# Patient Record
Sex: MALE | NOT HISPANIC OR LATINO
[De-identification: names, ages, dates, MRNs, and addresses within clinical notes are randomized per-mention and may not be internally consistent; named-entity substitution may affect disease eponyms.]

---

## 2017-11-06 PROCEDURE — 84154 ASSAY OF PSA FREE: CPT | Performed by: FAMILY MEDICINE

## 2017-11-06 PROCEDURE — 84153 ASSAY OF PSA TOTAL: CPT | Performed by: FAMILY MEDICINE

## 2018-05-25 ENCOUNTER — IMAGING SERVICES (OUTPATIENT)
Dept: OTHER | Age: 58
End: 2018-05-25

## 2018-05-25 ENCOUNTER — CHARTING TRANS (OUTPATIENT)
Dept: OTHER | Age: 58
End: 2018-05-25

## 2018-05-25 ASSESSMENT — PAIN SCALES - GENERAL: PAINLEVEL_OUTOF10: 0

## 2019-03-05 VITALS
HEART RATE: 96 BPM | TEMPERATURE: 97.7 F | SYSTOLIC BLOOD PRESSURE: 120 MMHG | RESPIRATION RATE: 18 BRPM | OXYGEN SATURATION: 94 % | DIASTOLIC BLOOD PRESSURE: 74 MMHG

## 2019-11-25 ENCOUNTER — HOSPITAL ENCOUNTER (INPATIENT)
Facility: HOSPITAL | Age: 59
LOS: 2 days | Discharge: HOME | DRG: 065 | End: 2019-11-27
Attending: EMERGENCY MEDICINE | Admitting: INTERNAL MEDICINE
Payer: COMMERCIAL

## 2019-11-25 ENCOUNTER — APPOINTMENT (OUTPATIENT)
Dept: MRI IMAGING | Facility: HOSPITAL | Age: 59
DRG: 065 | End: 2019-11-25
Attending: EMERGENCY MEDICINE
Payer: COMMERCIAL

## 2019-11-25 ENCOUNTER — APPOINTMENT (OUTPATIENT)
Dept: GENERAL RADIOLOGY | Facility: HOSPITAL | Age: 59
DRG: 065 | End: 2019-11-25
Attending: HOSPITALIST
Payer: COMMERCIAL

## 2019-11-25 DIAGNOSIS — R89.9 ABNORMAL LABORATORY TEST: ICD-10-CM

## 2019-11-25 DIAGNOSIS — I63.9 ACUTE ISCHEMIC STROKE (HCC): Primary | ICD-10-CM

## 2019-11-25 DIAGNOSIS — Z80.42 FAMILY HISTORY OF PROSTATE CANCER: ICD-10-CM

## 2019-11-25 DIAGNOSIS — G93.9 PONTINE LESION: ICD-10-CM

## 2019-11-25 DIAGNOSIS — E78.00 HIGH CHOLESTEROL: ICD-10-CM

## 2019-11-25 PROCEDURE — 71045 X-RAY EXAM CHEST 1 VIEW: CPT | Performed by: HOSPITALIST

## 2019-11-25 PROCEDURE — 70549 MR ANGIOGRAPH NECK W/O&W/DYE: CPT | Performed by: EMERGENCY MEDICINE

## 2019-11-25 PROCEDURE — 70553 MRI BRAIN STEM W/O & W/DYE: CPT | Performed by: EMERGENCY MEDICINE

## 2019-11-25 PROCEDURE — 70546 MR ANGIOGRAPH HEAD W/O&W/DYE: CPT | Performed by: EMERGENCY MEDICINE

## 2019-11-25 RX ORDER — ASPIRIN 81 MG/1
81 TABLET, CHEWABLE ORAL DAILY
Status: DISCONTINUED | OUTPATIENT
Start: 2019-11-26 | End: 2019-11-27

## 2019-11-25 RX ORDER — HEPARIN SODIUM 5000 [USP'U]/ML
5000 INJECTION, SOLUTION INTRAVENOUS; SUBCUTANEOUS EVERY 8 HOURS SCHEDULED
Status: DISCONTINUED | OUTPATIENT
Start: 2019-11-25 | End: 2019-11-26

## 2019-11-25 RX ORDER — POTASSIUM CHLORIDE 20 MEQ/1
40 TABLET, EXTENDED RELEASE ORAL ONCE
Status: COMPLETED | OUTPATIENT
Start: 2019-11-25 | End: 2019-11-25

## 2019-11-25 RX ORDER — ATORVASTATIN CALCIUM 20 MG/1
20 TABLET, FILM COATED ORAL NIGHTLY
Status: DISCONTINUED | OUTPATIENT
Start: 2019-11-25 | End: 2019-11-26

## 2019-11-25 RX ORDER — ACETAMINOPHEN 325 MG/1
650 TABLET ORAL EVERY 6 HOURS PRN
Status: DISCONTINUED | OUTPATIENT
Start: 2019-11-25 | End: 2019-11-27

## 2019-11-25 RX ORDER — ASPIRIN 81 MG/1
324 TABLET, CHEWABLE ORAL ONCE
Status: COMPLETED | OUTPATIENT
Start: 2019-11-25 | End: 2019-11-25

## 2019-11-25 RX ORDER — SODIUM CHLORIDE 9 MG/ML
INJECTION, SOLUTION INTRAVENOUS CONTINUOUS
Status: DISCONTINUED | OUTPATIENT
Start: 2019-11-25 | End: 2019-11-27

## 2019-11-25 NOTE — ED INITIAL ASSESSMENT (HPI)
Pt states Saturday he got on a ladder because he was painting and had to get off the ladder because he wasn't feeling right, \"something was wrong. \" pt sat on the couch and had an \"explosion without pain in his head, like a thousand cicadas in my head. \"

## 2019-11-25 NOTE — ED PROVIDER NOTES
Patient Seen in: [facility] Emergency Department      History   Patient presents with:  Stroke (neurologic)    Stated Complaint: reports \"stroke\" s/s on Saturday that resolved, was told to come in for further*    HPI    Patient is a 72-year-old ma Tobacco comment: in his 19's    Alcohol use: No      Comment: none for 5 years    Drug use:  No             Review of Systems    Positive for stated complaint: reports \"stroke\" s/s on Saturday that resolved, was told to come in for further*  Other systems plantarflexion, and at the [facility]. This is true bilaterally. Finger to nose intact. Heel to shin intact. Neg Kernig's and Brudzinski's sign. Skin: Skin is warm and dry. Psychiatric: Normal mood and affect.  Thought content normal.         E palpitations. Will obtain MRI/MRA brain and reevaluate afterwards. Admission disposition: 11/25/2019  8:07 PM            1943  Case was discussed with , neurology on-call. She did review the imaging.   Advises admission for potential treatme

## 2019-11-26 ENCOUNTER — APPOINTMENT (OUTPATIENT)
Dept: LAB | Facility: HOSPITAL | Age: 59
DRG: 065 | End: 2019-11-26
Attending: NURSE PRACTITIONER
Payer: COMMERCIAL

## 2019-11-26 ENCOUNTER — APPOINTMENT (OUTPATIENT)
Dept: GENERAL RADIOLOGY | Facility: HOSPITAL | Age: 59
DRG: 065 | End: 2019-11-26
Attending: NURSE PRACTITIONER
Payer: COMMERCIAL

## 2019-11-26 PROBLEM — G93.9 PONTINE LESION: Status: ACTIVE | Noted: 2019-11-26

## 2019-11-26 PROCEDURE — 009U3ZX DRAINAGE OF SPINAL CANAL, PERCUTANEOUS APPROACH, DIAGNOSTIC: ICD-10-PCS | Performed by: RADIOLOGY

## 2019-11-26 PROCEDURE — 62270 DX LMBR SPI PNXR: CPT | Performed by: NURSE PRACTITIONER

## 2019-11-26 PROCEDURE — 77003 FLUOROGUIDE FOR SPINE INJECT: CPT | Performed by: NURSE PRACTITIONER

## 2019-11-26 PROCEDURE — 99223 1ST HOSP IP/OBS HIGH 75: CPT | Performed by: OTHER

## 2019-11-26 RX ORDER — ATORVASTATIN CALCIUM 40 MG/1
40 TABLET, FILM COATED ORAL NIGHTLY
Status: DISCONTINUED | OUTPATIENT
Start: 2019-11-26 | End: 2019-11-27

## 2019-11-26 RX ORDER — POTASSIUM CHLORIDE 14.9 MG/ML
20 INJECTION INTRAVENOUS ONCE
Status: COMPLETED | OUTPATIENT
Start: 2019-11-26 | End: 2019-11-26

## 2019-11-26 NOTE — ED NOTES
Ambulatory to the bathroom with steady gait. Ok per Dr. Slade Chung to give patient a turkey sandwich. [place] and gatorade given to patient, awaiting admission.

## 2019-11-26 NOTE — PLAN OF CARE
Assumed care of patient at 0700  A/OX4, Neuro checks Q4, RA, SB on tele  Patient NPO for scheduled LP; LP conducted @ 3525 w/o complications  Patient's diet updated to regular/general  Patient denies pain  Plan is to wait on Neuro's assessment of LP result

## 2019-11-26 NOTE — PLAN OF CARE
Received pt from ER 2140. Aox4. Neuro checks q2hrs until 0600. Exam intact. Dr River Contreras notified of consult. Order to keep pt NPO after midnight. SB per tele, HR upper 40s to 50s while sleeping. SBP within ordered parameters. Denies pain.  POC updated wit

## 2019-11-26 NOTE — PAYOR COMM NOTE
--------------  ADMISSION REVIEW     Payor: Dawit Galarza #:  [id number]  Authorization Number: N/A    ED Provider Notes        Patient Seen in: [facility] Emergency Department      History   Patient presents with:  Stroke (neurologic) complaint: reports \"stroke\" s/s on Saturday that resolved, was told to come in for further*  Other systems are as noted in HPI. Constitutional and vital signs reviewed. All other systems reviewed and negative except as noted above.     Physical Exam Skin: Skin is warm and dry. Psychiatric: Normal mood and affect.  Thought content normal.         ED Course     Labs Reviewed   COMP METABOLIC PANEL (14) - Abnormal; Notable for the following components:       Result Value    Glucose 127 (*)     P symptoms to food poisoning as he ate some old pasta. He was found by his niece who was able to fill in some details as he doesn't recall the full event.  He now feels back to normal. He does have some mild ringing in his ears which is chronic, no hx of vert presented w/ multiple neurologic complaints.      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

## 2019-11-26 NOTE — H&P
[address] [facility] Patient Status:  Inpatient    1960 MRN [id number]   [facility] 7NE-A Attending Don Altamirano, [address] [facility] Day # 1 PCP Ave Cruz MD     Date:  2019  Date of Admission: years: .76      Smokeless tobacco: Never Used      Tobacco comment: in his 19's    Alcohol use: No      Comment: none for 5 years    Drug use: No      Allergies/Medications:    Allergies:   Sulfa Antibiotics           Comment:As a child  atorvastatin 20 MG Sujatha Vaughan MD on 11/25/2019 at 22:33          Mri Brain Mra Head+mra Neck (all W+wo) (cpt=[id number])    Result Date: 11/25/2019  CONCLUSION:  1. No acute infarct, intracranial hemorrhage, or hydrocephalus is identified.  2. There is a punctate focus [phone]

## 2019-11-26 NOTE — CONSULTS
[facility]    Report of Consultation    Kayy Levin Patient Status:  Inpatient    1960 MRN [id number]   [facility] 7NE-A Attending Slime Rivera, [address] [facility] Day # 1 PCP Shirl Collet, MD     Date of Admission:  2019 Neurological Disorder Paternal Grandmother         PARKINSONS   • Cancer Paternal Grandfather         PROSTATE      reports that he has quit smoking. He has a 0.75 pack-year smoking history.  He has never used smokeless tobacco. He reports that he does not 11/25/2019    BILT 1.2 11/25/2019    ALB 4.1 11/25/2019    TP 7.2 11/25/2019       Imaging:  MRI brain, MRA H/N all w/wo: CONCLUSION:    1. No acute infarct, intracranial hemorrhage, or hydrocephalus is identified.   2. There is a punctate focus of T2 hyper

## 2019-11-27 ENCOUNTER — APPOINTMENT (OUTPATIENT)
Dept: CV DIAGNOSTICS | Facility: HOSPITAL | Age: 59
DRG: 065 | End: 2019-11-27
Attending: Other
Payer: COMMERCIAL

## 2019-11-27 VITALS
DIASTOLIC BLOOD PRESSURE: 87 MMHG | TEMPERATURE: 98 F | RESPIRATION RATE: 15 BRPM | HEIGHT: 73 IN | WEIGHT: 200 LBS | OXYGEN SATURATION: 99 % | BODY MASS INDEX: 26.51 KG/M2 | SYSTOLIC BLOOD PRESSURE: 145 MMHG | HEART RATE: 71 BPM

## 2019-11-27 PROCEDURE — 93306 TTE W/DOPPLER COMPLETE: CPT | Performed by: OTHER

## 2019-11-27 PROCEDURE — 99233 SBSQ HOSP IP/OBS HIGH 50: CPT | Performed by: OTHER

## 2019-11-27 RX ORDER — ATORVASTATIN CALCIUM 40 MG/1
TABLET, FILM COATED ORAL
Qty: 90 TABLET | Refills: 3 | Status: SHIPPED | OUTPATIENT
Start: 2019-11-27 | End: 2020-10-01

## 2019-11-27 NOTE — DISCHARGE SUMMARY
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 No rebound tenderness  Neurologic: No focal neurological deficits. Musculoskeletal: Full range of motion of all extremities. No swelling noted. Integument: No lesions. No erythema. Psychiatric: Appropriate mood and affect.     Discharge Plan:

## 2019-11-27 NOTE — PLAN OF CARE
Patient is alert and oriented. Denies pain and nausea. Bandaid to lower back clean,dry, intact. Denies pain or nausea. Ambulates independently in room. NSR/SB on telemetry. Plan to return home at discharge.        Problem: Patient/Family Goals  Goal: P

## 2019-11-27 NOTE — PLAN OF CARE
Assumed care of patient at 0700  A/OX4, Q4 neuro checks performed, RA, NSR/SB  Echo performed on patient w/o complication  Neurology saw patient and was okay to d/c home.    Discharge planning discussed with patient and all questions were answered with the

## 2019-11-27 NOTE — PROGRESS NOTES
[id number] [facility] Progress Note    Sandra Hernandez Patient Status:  Inpatient    1960 MRN [id number]   [facility] 7NE-A Attending Isabela Montana, [address] [facility] Day # 2 PCP Emmanuel Hein MD     [facility]      Neurology A Speech: Fluent, no dysarthria  Memory and comprehension: Intact    Cranial Nerves: VFF, PERRL 3mm brisk, EOMI, no nystagmus, facial sensation intact, face symmetric, tongue midline, shoulder shrug equal, normal hearing, remainder CN GI   Motor: No drift, demyelination  Syncope  S/p LP on 11/26    Diagnostics/Imaging    MRA H/N: short segment stenosis in V4 right vert, otherwise unremarkable   Echo pending     TSH 1.440  A1C  5.9  B12 585  JACKIE pending  CSF protein 50.3, glucose 53, WBC 1, rest of lab

## 2019-12-01 NOTE — PAYOR COMM NOTE
--------------  DISCHARGE REVIEW    Payor: [address]Gavi Daniels [facility] #:  [id number]  Authorization Number: [id number]    Admit date: 11/25/19  Admit time:  2140  Discharge Date: 11/27/2019  5:21 PM     Admitting Physician: DO Zenia Red

## 2019-12-04 ENCOUNTER — TELEPHONE (OUTPATIENT)
Dept: NEUROLOGY | Facility: CLINIC | Age: 59
End: 2019-12-04

## 2019-12-04 RX ORDER — CYCLOBENZAPRINE HCL 10 MG
10 TABLET ORAL 3 TIMES DAILY PRN
Qty: 30 TABLET | Refills: 0 | Status: SHIPPED | OUTPATIENT
Start: 2019-12-04 | End: 2019-12-17

## 2019-12-04 NOTE — TELEPHONE ENCOUNTER
Patient notified that Rx Flexeril was sent to his pharmacy and to call if not improving with Flexeril. Patient verbalized understanding.

## 2019-12-04 NOTE — TELEPHONE ENCOUNTER
Any leg pain or leg weakness? Any fever or signs of infection     Probable lumbar/muscle strain. Take muscle relaxant Flexeril 10 mg TID prn.   If Flexeril 10 mg too strong can take it half tab or take it night only  If any changes call the clinic

## 2019-12-04 NOTE — TELEPHONE ENCOUNTER
S: Pain at LP site. B: Consult in hospital on 11/26.     Episode of transient neurological symptoms  Left pontomedullary lesion-subacute ischemia vs demyelination  Syncope     Discussed with the patient the differential diagnosis.  Atypical location for

## 2019-12-05 ENCOUNTER — TELEPHONE (OUTPATIENT)
Dept: NEUROLOGY | Facility: CLINIC | Age: 59
End: 2019-12-05

## 2019-12-05 NOTE — TELEPHONE ENCOUNTER
Relayed below results to pt. Berbalized understanding and has no further questions. Advised pt to keep follow up appt on 12/17/19. MD GREGORY Aguilar Nurse             Negative.  Most of CSF tests are ok and no e/o MS or other inf

## 2019-12-17 ENCOUNTER — OFFICE VISIT (OUTPATIENT)
Dept: NEUROLOGY | Facility: CLINIC | Age: 59
End: 2019-12-17
Payer: COMMERCIAL

## 2019-12-17 VITALS
WEIGHT: 212 LBS | HEART RATE: 76 BPM | DIASTOLIC BLOOD PRESSURE: 72 MMHG | RESPIRATION RATE: 16 BRPM | BODY MASS INDEX: 28 KG/M2 | SYSTOLIC BLOOD PRESSURE: 114 MMHG

## 2019-12-17 DIAGNOSIS — G93.9 PONTINE LESION: ICD-10-CM

## 2019-12-17 DIAGNOSIS — I63.9 ACUTE ISCHEMIC STROKE (HCC): Primary | ICD-10-CM

## 2019-12-17 PROCEDURE — 99213 OFFICE O/P EST LOW 20 MIN: CPT | Performed by: OTHER

## 2019-12-17 NOTE — PROGRESS NOTES
HPI:    Patient ID: Sandra Hernandez is a 66year old male. Neurologic Problem   The patient's pertinent negatives include no syncope or weakness. Associated symptoms include palpitations.  Pertinent negatives include no dizziness, headaches or light-headed 20's    Alcohol use: No      Comment: none for 5 years    Drug use: No           Review of Systems   Constitutional: Negative. HENT: Negative. Eyes: Negative. Cardiovascular: Positive for palpitations. Gastrointestinal: Negative.     Endocrine: N in midline with normal lateral movements. Sensory : Intact to light touch symmetrically  Motor: Normal tone and bulk in all extremities.  Strength is 5/5 in all muscle groups  Reflexes: symmetric and present  Coordination: Intact   Gait: Normal.     NIHSS-

## 2020-01-13 ENCOUNTER — HOSPITAL ENCOUNTER (OUTPATIENT)
Dept: CV DIAGNOSTICS | Facility: HOSPITAL | Age: 60
Discharge: HOME | End: 2020-01-13
Attending: Other
Payer: COMMERCIAL

## 2020-01-13 DIAGNOSIS — I63.9 ACUTE ISCHEMIC STROKE (HCC): ICD-10-CM

## 2020-01-13 PROCEDURE — 93271 ECG/MONITORING AND ANALYSIS: CPT | Performed by: OTHER

## 2020-01-13 PROCEDURE — 93272 ECG/REVIEW INTERPRET ONLY: CPT | Performed by: OTHER

## 2020-01-13 PROCEDURE — 93270 REMOTE 30 DAY ECG REV/REPORT: CPT | Performed by: OTHER

## 2020-01-27 ENCOUNTER — HOSPITAL ENCOUNTER (OUTPATIENT)
Dept: MRI IMAGING | Facility: HOSPITAL | Age: 60
Discharge: HOME | End: 2020-01-27
Attending: Other
Payer: COMMERCIAL

## 2020-01-27 DIAGNOSIS — G93.9 PONTINE LESION: ICD-10-CM

## 2020-01-27 PROCEDURE — A9575 INJ GADOTERATE MEGLUMI 0.1ML: HCPCS | Performed by: OTHER

## 2020-01-27 PROCEDURE — A9575 INJ GADOTERATE MEGLUMI 0.1ML: HCPCS

## 2020-01-27 PROCEDURE — 70553 MRI BRAIN STEM W/O & W/DYE: CPT | Performed by: OTHER

## 2020-02-05 ENCOUNTER — TELEPHONE (OUTPATIENT)
Dept: NEUROLOGY | Facility: CLINIC | Age: 60
End: 2020-02-05

## 2020-02-05 NOTE — TELEPHONE ENCOUNTER
MRI results left on VM (ok per HIPAA consent). 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  No interval new lesions

## 2020-02-06 NOTE — TELEPHONE ENCOUNTER
Pt called office back for Clarification of MRI results. Went over MRI results with per note below.  Scheduled stroke follow up with pt for 03- at 1000.      ----- Message from Yasmeen Lopez MD sent at 2/4/2020  9:19 AM CST -----  Residual changes

## 2020-02-11 ENCOUNTER — HOSPITAL ENCOUNTER (OUTPATIENT)
Dept: MRI IMAGING | Facility: HOSPITAL | Age: 60
Discharge: HOME | End: 2020-02-11
Attending: UROLOGY
Payer: COMMERCIAL

## 2020-02-11 DIAGNOSIS — R97.20 ELEVATED PSA: ICD-10-CM

## 2020-02-11 PROCEDURE — 72197 MRI PELVIS W/O & W/DYE: CPT | Performed by: UROLOGY

## 2020-02-11 PROCEDURE — A9575 INJ GADOTERATE MEGLUMI 0.1ML: HCPCS | Performed by: UROLOGY

## 2020-02-17 ENCOUNTER — TELEPHONE (OUTPATIENT)
Dept: NEUROLOGY | Facility: CLINIC | Age: 60
End: 2020-02-17

## 2020-02-17 NOTE — TELEPHONE ENCOUNTER
Left message on pt voice mail (OK per HIPPA) informing him that referral has been placed for him to see cardiology, provided number to central scheduling. Will call pt back to ensure message was received.     Copy of [facility] report sent to scanning wi

## 2020-02-17 NOTE — TELEPHONE ENCOUNTER
Received EKG report from [facility] indicating  Sinus Bradycardia and patient's report of chest pain/passing out. Dr Karthik Rios was notified at 4:55 on 2/16/2020 of event. Report placed on Dr Bob holder for review.

## 2020-02-18 NOTE — TELEPHONE ENCOUNTER
ARY on VM (ok per HIPAA consent) to verify he received message yesterday regarding Cardiology referral.

## 2020-02-18 NOTE — TELEPHONE ENCOUNTER
PS[facility] staff transferred call. Pt was unsure as to why he was referred to Cardiology. Explained below. States he has been symptom free, feels great. Is asking if he really needs to see Cardiology?      Spoke with Dr. Freyd Vera, due to his previous sympto

## 2021-11-16 PROBLEM — I63.9 ACUTE ISCHEMIC STROKE (HCC): Status: RESOLVED | Noted: 2019-11-25 | Resolved: 2021-11-16

## 2022-02-23 NOTE — PROGRESS NOTES
[id number] [facility] Preliminary Note    Grady Stone Patient Status:  Inpatient    1960 MRN [id number]   [facility] 7NE-A Attending Raulito Yousif, [address] [facility] Day # 1 PCP Dom Clarke MD     REASON FOR CONSULTATION: are noted in HPI.       PAST MEDICAL HISTORY:  Past Medical History:   Diagnosis Date   • ANXIETY    • Anxiety    • HYPERLIPIDEMIA    • Hyperlipidemia        PAST SURGICAL HISTORY:  Past Surgical History:   Procedure Laterality Date   • ADENOIDECTOMY     • auscultation bilaterally. HEART:  S1, S2, Regular rate and rhythm.   ABD: Soft, non tender  SKIN: Warm/Dry    NEUROLOGICAL:    Mental status: Alert  Orientation: oriented to person, place and time   Speech: fluent, no dysarthria  Memory and comprehension: numbness. Currently all his symptoms have resolved    MRI reported with no acute infarct/hemorrage, punctate T2 hyperintense signal in dorsal left loly, and left pontomedullary junction.  Possible subacute infarcts or demyelinating process       Plan:    NP Family